# Patient Record
Sex: FEMALE | Race: BLACK OR AFRICAN AMERICAN
[De-identification: names, ages, dates, MRNs, and addresses within clinical notes are randomized per-mention and may not be internally consistent; named-entity substitution may affect disease eponyms.]

---

## 2018-01-10 ENCOUNTER — HOSPITAL ENCOUNTER (OUTPATIENT)
Dept: HOSPITAL 61 - MAMMO | Age: 63
Discharge: HOME | End: 2018-01-10
Attending: FAMILY MEDICINE
Payer: COMMERCIAL

## 2018-01-10 DIAGNOSIS — Z12.31: Primary | ICD-10-CM

## 2018-01-10 PROCEDURE — 77067 SCR MAMMO BI INCL CAD: CPT

## 2019-01-15 ENCOUNTER — HOSPITAL ENCOUNTER (OUTPATIENT)
Dept: HOSPITAL 61 - MAMMO | Age: 64
Discharge: HOME | End: 2019-01-15
Attending: FAMILY MEDICINE
Payer: MEDICARE

## 2019-01-15 DIAGNOSIS — Z12.31: Primary | ICD-10-CM

## 2019-01-15 PROCEDURE — 77063 BREAST TOMOSYNTHESIS BI: CPT

## 2019-01-15 PROCEDURE — 77067 SCR MAMMO BI INCL CAD: CPT

## 2019-01-16 NOTE — RAD
DATE: 1/15/2019



EXAM: MAMMO RIC SCREENING BILATERAL



HISTORY: Routine screening



COMPARISON: 1/10/2018



This study was interpreted with the benefit of Computerized Aided Detection

(CAD).





Breast Density: SCATTERED The breast parenchyma shows scattered fibroglandular

densities. Breast parenchyma level B.





FINDINGS: 2-D and 3-D tomosynthesis imaging was performed in CC and MLO

projections.  No new or enlarging breast densities are seen.  No suspicious

microcalcifications are evident.  





IMPRESSION: Stable mammograms without evidence of malignancy.







BI-RADS CATEGORY: 1 NEGATIVE



RECOMMENDED FOLLOW-UP: 12M 12 MONTH FOLLOW-UP



PQRS compliance statement: Patient information was entered into a reminder

system with a target due date     for the next mammogram.



Mammography is a sensitive method for finding small breast cancers, but it

does not detect them all and is not a substitute for careful clinical

examination.  A negative mammogram does not negate a clinically suspicious

finding and should not result in delay in biopsying a clinically suspicious

abnormality.



"Our facility is accredited by the [employer] Mammography

Program."

## 2020-01-27 ENCOUNTER — HOSPITAL ENCOUNTER (OUTPATIENT)
Dept: HOSPITAL 61 - MAMMO | Age: 65
Discharge: HOME | End: 2020-01-27
Attending: FAMILY MEDICINE
Payer: MEDICARE

## 2020-01-27 DIAGNOSIS — Z12.31: Primary | ICD-10-CM

## 2020-01-27 PROCEDURE — 77067 SCR MAMMO BI INCL CAD: CPT

## 2020-01-27 PROCEDURE — 77063 BREAST TOMOSYNTHESIS BI: CPT

## 2020-01-27 NOTE — RAD
EXAM: BILATERAL DIGITAL 3D SCREENING MAMMOGRAPHY.

 

HISTORY: Routine mammographic screening. 

 

TECHNIQUE: Bilateral digital 3D and tomographic images were obtained in CC

and MLO projections. Computer-aided detection was applied.

 

COMPARISON: 01/15/2019.

 

COMPOSITION: B. There are scattered areas of fibroglandular density.

 

FINDINGS: There are no suspicious masses, microcalcifications or 

architectural distortion. The parenchymal pattern is stable.

 

BI-RADS CATEGORY 2: Benign.

 

RECOMMENDATION:

1. Routine screening mammography in one year.

 

If mammography demonstrates dense breast tissue (heterogenously dense or 

extremely dense, category C or D), which could hide abnormalities, and if 

other risk factors for breast cancer have been identified, supplemental 

screening tests that may be suggested by the ordering physician may be of 

benefit. Dense breast tissue, in and of itself, is a relatively common 

condition. Therefore, this information is not provided to cause undue 

concern, but rather to raise awareness and to promote discussion with the 

referring physician regarding the presence of other risk factors, in 

addition to dense breast tissue. The results of this mammography 

examination is provided to the patient and referring physician. The 

patient should contact their referring physician if any questions or 

concerns exist regarding this report.

 

PQRS compliance statement -  Patient information was entered into a 

reminder system with a target due date for the next mammogram. 

 

"Our facility is accredited by the [employer] 

Mammography Program."

 

 

 

Electronically signed by: GINA Nesbitt MD (1/27/2020 3:46 PM) [id number]

## 2021-01-27 ENCOUNTER — HOSPITAL ENCOUNTER (OUTPATIENT)
Dept: HOSPITAL 61 - MAMMO | Age: 66
End: 2021-01-27
Attending: FAMILY MEDICINE
Payer: MEDICARE

## 2021-01-27 DIAGNOSIS — Z12.31: Primary | ICD-10-CM

## 2021-01-27 PROCEDURE — 77067 SCR MAMMO BI INCL CAD: CPT

## 2021-01-27 PROCEDURE — 77063 BREAST TOMOSYNTHESIS BI: CPT

## 2021-01-28 NOTE — RAD
EXAMINATION: MG BILAT SCREEN+RIC



CLINICAL HISTORY: Routine screening, history of benign right breast biopsy



TECHNIQUE: Digital craniocaudal and mediolateral oblique views of the bilateral breasts obtained with
 3-D tomosynthesis.



COMPARISON: 1/27/2020, 1/15/2019, 1/10/2018



BREAST COMPOSITION: There are scattered areas of fibroglandular density.





FINDINGS:  



No evidence of suspicious mass, calcifications, or areas of architectural distortion.





IMPRESSION:  



No mammographic evidence of malignancy.



BI-RADS ASSESSMENT:  



Category 1: Negative



RECOMMENDATION:  



Return for routine bilateral screening mammogram in one year.





PQRS compliance statement -  Patient information was entered into a reminder system with a target due
 date for the next mammogram. 



"Our facility is accredited by the [employer] Mammography Program."



Electronically signed by: Delfin Rodriges DO (1/28/2021 8:49 AM) [id number]

## 2021-03-08 ENCOUNTER — HOSPITAL ENCOUNTER (OUTPATIENT)
Dept: HOSPITAL 61 - KCIC DEXA | Age: 66
End: 2021-03-08
Attending: FAMILY MEDICINE
Payer: MEDICARE

## 2021-03-08 DIAGNOSIS — Z78.0: Primary | ICD-10-CM

## 2021-03-08 PROCEDURE — 77080 DXA BONE DENSITY AXIAL: CPT

## 2021-03-08 NOTE — [facility]
EXAM: DUAL ENERGY X-RAY ABSORPTIOMETRY (DEXA).



HISTORY: Postmenopausal screening.



FINDINGS: The lowest measured T-score is -0.9 in the lumbar spine, based on a bone mineral density of
 0.951 g/cm^2. Refer to the worksheets for full detail.



No comparison examinations are available.



IMPRESSION:

1. Normal. Bone mineral density yields a T-score of -1.0 or greater. Fracture risk is low.

2. FRAX report: Not reported.





METHODOLOGY: Dual energy x-ray absorptiometry was performed to measure bone mineral density. The foll
owing analysis is based on the 2019 Official Positions of the International Society for Clinical Dens
itometry: 



Measurements of the hips and the average of L1-L4 are preferred. When the spine and/or hip cannot be 
feasibly measured or interpreted, or in the setting of hyperparathyroidism, distal radial bone minera
l density may be measured. 



The lumbar spine T-score is based on the average bone mineral density of L1-L4. In the setting of art
ifact or anatomic abnormality, some lumbar levels may be excluded, and the remaining levels used for 
calculation. A single lumbar level is not used for diagnosis, and if only a single level is available
 for assessment, another anatomic site will be used to assign a diagnosis.



The hip T-score is based on the bone mineral density measurement of the femoral neck or total proxima
l femur of either side, whichever is lowest. Bilateral mean values are not used for diagnosis.



The forearm T-score is derived from 33% of the distal radius of the nondominant forearm.



Electronically signed by: Gisella Leslie MD (3/8/2021 1:23 PM) [id number]

## 2022-01-31 ENCOUNTER — HOSPITAL ENCOUNTER (OUTPATIENT)
Dept: HOSPITAL 61 - MAMMO | Age: 67
End: 2022-01-31
Attending: FAMILY MEDICINE
Payer: MEDICARE

## 2022-01-31 DIAGNOSIS — Z12.31: Primary | ICD-10-CM

## 2022-01-31 PROCEDURE — 77067 SCR MAMMO BI INCL CAD: CPT

## 2022-01-31 PROCEDURE — 77063 BREAST TOMOSYNTHESIS BI: CPT

## 2022-01-31 NOTE — RAD
BILATERAL SCREENING MAMMOGRAM



History: Routine screening.



Comparison:  Most recently on 1/27/2021. 



Technique: Routine 2D and 3D tomosynthesis digital mammogram views were obtained bilaterally. Interpr
etation was assisted with the use of computer-aided detection.



Findings: 



Breast Tissue Density B : There are scattered areas of fibroglandular density.



There are no dominant masses, suspicious microcalcifications, or architectural distortion.



IMPRESSION:



No mammographic evidence of malignancy. Recommend routine screening mammography in one year.



BI-RADS category 1:  Negative.



Patient information is entered into the reminder system with a target due date for the next screening
 mammogram.



"Our facility is accredited by the [employer] Mammography Program."



Electronically signed by: JUANITO GRAHAM MD (1/31/2022 3:10 PM) [id number]